# Patient Record
Sex: MALE | Race: BLACK OR AFRICAN AMERICAN | NOT HISPANIC OR LATINO | Employment: FULL TIME | URBAN - METROPOLITAN AREA
[De-identification: names, ages, dates, MRNs, and addresses within clinical notes are randomized per-mention and may not be internally consistent; named-entity substitution may affect disease eponyms.]

---

## 2017-02-13 ENCOUNTER — TELEPHONE (OUTPATIENT)
Dept: RHEUMATOLOGY | Facility: CLINIC | Age: 57
End: 2017-02-13

## 2017-02-13 NOTE — TELEPHONE ENCOUNTER
----- Message from Alan Zavala sent at 2/13/2017  3:44 PM CST -----  Contact: self@mobile,  Pt called because he has Neuropathy and foot drop in both feet and needs to be seen. I offered 3/28/2017, but Pt stated that he needs to be seen as soon as possible. Please call Pt and advise if there are any openings this week.    Call back is mobile, [phone]    Thank you

## 2017-02-14 ENCOUNTER — TELEPHONE (OUTPATIENT)
Dept: RHEUMATOLOGY | Facility: CLINIC | Age: 57
End: 2017-02-14

## 2017-02-14 ENCOUNTER — INITIAL CONSULT (OUTPATIENT)
Dept: RHEUMATOLOGY | Facility: CLINIC | Age: 57
End: 2017-02-14
Payer: COMMERCIAL

## 2017-02-14 VITALS
SYSTOLIC BLOOD PRESSURE: 107 MMHG | WEIGHT: 148.19 LBS | BODY MASS INDEX: 21.22 KG/M2 | DIASTOLIC BLOOD PRESSURE: 71 MMHG | HEIGHT: 70 IN | HEART RATE: 98 BPM

## 2017-02-14 DIAGNOSIS — M21.372 BILATERAL FOOT-DROP: Primary | ICD-10-CM

## 2017-02-14 DIAGNOSIS — M21.371 BILATERAL FOOT-DROP: Primary | ICD-10-CM

## 2017-02-14 DIAGNOSIS — G63 POLYNEUROPATHY ASSOCIATED WITH UNDERLYING DISEASE: ICD-10-CM

## 2017-02-14 PROCEDURE — 99999 PR PBB SHADOW E&M-EST. PATIENT-LVL III: CPT | Mod: PBBFAC,,, | Performed by: INTERNAL MEDICINE

## 2017-02-14 PROCEDURE — 99245 OFF/OP CONSLTJ NEW/EST HI 55: CPT | Mod: S$GLB,,, | Performed by: INTERNAL MEDICINE

## 2017-02-14 RX ORDER — HUMAN INSULIN 100 [USP'U]/ML
INJECTION, SUSPENSION SUBCUTANEOUS
COMMUNITY
Start: 2017-01-09

## 2017-02-14 RX ORDER — PEN NEEDLE, DIABETIC 29 G X1/2"
NEEDLE, DISPOSABLE MISCELLANEOUS
COMMUNITY
Start: 2017-01-08

## 2017-02-14 ASSESSMENT — ROUTINE ASSESSMENT OF PATIENT INDEX DATA (RAPID3)
PAIN SCORE: 10
TOTAL RAPID3 SCORE: 7.61
PATIENT GLOBAL ASSESSMENT SCORE: 9.5
PSYCHOLOGICAL DISTRESS SCORE: 0
MDHAQ FUNCTION SCORE: 1
AM STIFFNESS SCORE: 0, NO
FATIGUE SCORE: 9

## 2017-02-14 NOTE — LETTER
February 14, 2017      Didier Brandt MD  [address] [place] [address]  [address]  [place] [address]           [facility]  [address]  [place] [address]  Phone: [phone]  Fax: [phone]          Patient: Abebe Asencio   MR Number: [id number]   YOB: 1960   Date of Visit: 2/14/2017       Dear Dr. Didier Brandt:    Thank you for referring Abebe Asencio to me for evaluation. Attached you will find relevant portions of my assessment and plan of care.    If you have questions, please do not hesitate to call me. I look forward to following Abebe Asencio along with you.    Sincerely,    Rita Mcneal MD    Enclosure  CC:  No Recipients    If you would like to receive this communication electronically, please contact [email or web address] or [phone] to request more information on [employer] access.    For providers and/or their staff who would like to refer a patient to [facility], please contact us through our one-stop-shop provider referral line, [facility], at [phone].    If you feel you have received this communication in error or would no longer like to receive these types of communications, please e-mail [email or web address]

## 2017-02-14 NOTE — PROGRESS NOTES
Subjective:       Patient ID: Abebe Asencio is a 56 y.o. male sent for consultation on possible vasculitis as a cause of bilateral foot drop by Dr.Steven Brandt     Chief Complaint: Disease Management    HPI   56 yr old man who has been diabetic x >10 yrs.  He was doing very well until 5-6 months ago when he began to experience numbness & tingling in his feet & then noted foot drop first on left and then right foot as well. He was also experiencing tingling in hands. Had EMG by Dr.Steven Brandt on left leg at [facility] [phone]) and told to see a rheumatologist.  He was put on gabapentin 100 mg tid, which is helping his paresthesias a little. He's been on it a few months. 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     Occasional swelling of calves & feet.   Weighed 185 lb 2 years ago. Weighed 170 lb 1.5 years. Gradually has lost weight.    AM stiffness a few minutes. Denies joint pain, joint swelling, Raynaud's, dysphagia, tight skin, alopecia, oral ulcers, sicca symptoms, pleurisy, pericarditis, photosensitivity, skin rashes, thromboses.      Current Outpatient Prescriptions   Medication Sig Dispense Refill    insulin syringe-needle U-100 0.3 mL 30 gauge x 5/16 Syrg       metformin (GLUCOPHAGE) 500 MG tablet Take 500 mg by mouth 2 (two) times daily with meals.      NOVOLIN 70/30 100 unit/mL (70-30) injection        No current facility-administered medications for this visit.        Review of patient's allergies indicates:  No Known Allergies    Past Medical History   Diagnosis Date    Diabetes mellitus    Had abscess drained from back of head 7/15  Past Surgical History   Procedure Laterality Date    Appendectomy      Knee surgery       l knee       Review of Systems   Constitutional: Positive for fatigue and unexpected weight change (40 lbs over 2 years; gradual weight loss.). Negative for fever.   HENT: Negative.  Negative for hearing loss, mouth  "sores, sore throat, tinnitus and trouble swallowing.    Eyes: Negative.  Negative for visual disturbance.        Had bilateral cataract extractions June & July of 2016   Respiratory: Negative.  Negative for cough, choking, chest tightness and shortness of breath.    Cardiovascular: Positive for leg swelling. Negative for chest pain and palpitations.   Gastrointestinal: Positive for diarrhea. Negative for abdominal pain, blood in stool, constipation, nausea and vomiting.   Genitourinary: Negative.  Negative for frequency, hematuria and urgency.        Erectile dysfunction.   Musculoskeletal: Negative.  Negative for arthralgias, back pain, joint swelling, myalgias, neck pain and neck stiffness.   Skin: Negative.  Negative for rash.   Neurological: Positive for light-headedness (infrequent) and numbness. Negative for dizziness, syncope and headaches.   Hematological: Does not bruise/bleed easily.   Psychiatric/Behavioral: Negative.  Negative for dysphoric mood and sleep disturbance. The patient is not nervous/anxious.        Family History   Problem Relation Age of Onset    Diabetes Mother     Heart disease Mother     Diabetes Father     Heart disease Father    both parents with DM: ages 64 & 71  Brother 58 is diabetic.  2 children in good health; one with asthma.  Social History   Substance Use Topics    Smoking status: Never Smoker    Smokeless tobacco: None    Alcohol use Yes     Walked every day until foot drops intervened. .       Objective:     Visit Vitals    /71    Pulse 98    Ht 5' 10" (1.778 m)    Wt 67.2 kg (148 lb 3.2 oz)    BMI 21.26 kg/m2        Physical Exam   Vitals reviewed.  Constitutional: He is oriented to person, place, and time and well-developed, well-nourished, and in no distress. No distress.   HENT:   Head: Normocephalic and atraumatic.   Mouth/Throat: Oropharynx is clear and moist. No oropharyngeal exudate.   No facial rashes  Parotids not enlarged  No oral ulcers   Eyes: " Conjunctivae and EOM are normal. Pupils are equal, round, and reactive to light. Right eye exhibits no discharge. Left eye exhibits no discharge. No scleral icterus.   Neck: Neck supple. No JVD present. No tracheal deviation present. No thyromegaly present.   Cardiovascular: Normal rate, regular rhythm, normal heart sounds and intact distal pulses.  Exam reveals no gallop and no friction rub.    No murmur heard.  Pulmonary/Chest: Effort normal and breath sounds normal. No respiratory distress. He has no wheezes. He has no rales. He exhibits no tenderness.   Abdominal: Soft. Bowel sounds are normal. He exhibits no distension and no mass. There is no splenomegaly or hepatomegaly. There is no tenderness. There is no rebound and no guarding.   Lymphadenopathy:     He has no cervical adenopathy.        Right: No inguinal adenopathy present.        Left: No inguinal adenopathy present.   Neurological: He is alert and oriented to person, place, and time. He has normal reflexes. No cranial nerve deficit. Gait normal.   Proximal strength 5/5.  Distal UE: 5/5;  Distal LE: cannot dorsiflex; decreased eversion;   Plantar flexion ok;    Skin: Skin is warm and dry. No rash noted. He is not diaphoretic.     Psychiatric: Mood, memory, affect and judgment normal.   Musculoskeletal: Normal range of motion. He exhibits no edema or tenderness.   Joint exam:  FROM all joints  No synovitis anywhere.        1/24/17:  (196);  (67)      1/25/17: EMG ([facility]): severe chronic & acute sensorimotor polyneuropathy with axonal and demyelinating features.  The acute denervation was seen in the lower extremity only. L cervical and lumbosacral polyneuropathy cannot be excluded. The patient has diabetes which may be the source of these findings but CIDP and possibly overlying motor neuron disease cannot be excluded.   Assessment:       Bilateral food drop    EMG: severe acute & chronic sensorimotor polyneuropathy   R/O  CIDP   Doubt systemic vaculitis  DM with polyneuropathy   Sxs: hands & feet   S/P abscess back of head 7/15  Mildly elevated CPK   No significance  Elevated ACE   Probably secondary to DM      Plan:   Labs & UA.  Refer back to Dr. Brandt to handle neurological disorder  May need CSF, MRI, bx?        C.C.: MD Brian Burrows MD (# [phone])      Addendum: Hg 12.9; Ht 36.8; UA SGr. 1.030, 3+ glu

## 2017-02-14 NOTE — MR AVS SNAPSHOT
"    [facility] - Rheumatology  [address]  [facility] [address]  Phone: [phone]  Fax: [phone]                  Abebe Asencio   2017 2:00 PM   Initial consult    Description:  Male : 1960   Provider:  Rita Mcneal MD   Department:  [facility] - Rheumatology           Reason for Visit     Disease Management           Diagnoses this Visit        Comments    Bilateral foot-drop    -  Primary     Polyneuropathy associated with underlying disease                To Do List           Goals (5 Years of Data)     None      [facility]     [facility] Care Line -  Assistance  Registered nurses in the [facility] provide clinical advisement, health education, appointment booking, and other advisory services.  Call for this free service at [phone].             Medications           Message regarding Medications     Verify the changes and/or additions to your medication regime listed below are the same as discussed with your clinician today.  If any of these changes or additions are incorrect, please notify your healthcare provider.             Verify that the below list of medications is an accurate representation of the medications you are currently taking.  If none reported, the list may be blank. If incorrect, please contact your healthcare provider. Carry this list with you in case of emergency.           Current Medications     insulin syringe-needle U-100 0.3 mL 30 gauge x 5/16 Syrg     metformin (GLUCOPHAGE) 500 MG tablet Take 500 mg by mouth 2 (two) times daily with meals.    NOVOLIN 70/30 100 unit/mL (70-30) injection            Clinical Reference Information           Your Vitals Were     BP Pulse Height Weight BMI    107/71 98 5' 10" (1.778 m) 67.2 kg (148 lb 3.2 oz) 21.26 kg/m2      Blood Pressure          Most Recent Value    BP  107/71      Allergies as of 2017     No Known Allergies      Immunizations Administered on Date of " Encounter - 2/14/2017     None      Orders Placed During Today's Visit      Normal Orders This Visit    Cryoglobulin     Future Labs/Procedures Expected by Expires    Anti-neutrophilic cytoplasmic antibody  2/14/2017 2/14/2018    Hepatitis B core antibody, total  2/14/2017 4/15/2018    Hepatitis B surface antibody  2/14/2017 4/15/2018    Hepatitis B surface antigen  2/14/2017 4/15/2018    Hepatitis C antibody  2/14/2017 4/15/2018    Myeloperoxidase Antibody (MPO)  2/14/2017 4/15/2018    Protein / creatinine ratio, urine  2/14/2017 4/15/2018    Proteinase 3 Autoantibodies  2/14/2017 4/15/2018    Urinalysis  2/14/2017 4/15/2018    Recurring Lab Work Interval Expires    C-reactive protein   2/14/2018    CBC auto differential   2/14/2018    Comprehensive metabolic panel   2/14/2018    Sedimentation rate, manual   2/14/2018      [facility] Sign-Up     Activating your [facility] account is as easy as 1-2-3!     1) Visit [email or web address], select Sign Up Now, enter this activation code and your date of birth, then select Next.  [email or web address]  Expires: 3/31/2017  3:22 PM      2) Create a username and password to use when you visit [facility] in the future and select a security question in case you lose your password and select Next.    3) Enter your e-mail address and click Sign Up!    Additional Information  If you have questions, please e-mail [email or web address] or call [phone] to talk to our [facility] staff. Remember, [facility] is NOT to be used for urgent needs. For medical emergencies, dial 911.         Language Assistance Services     ATTENTION: Language assistance services are available, free of charge. Please call [phone].      ATENCIÓN: Si habla neli, tiene a lr disposición servicios gratuitos de asistencia lingüística. Llame al [phone].     CHÚ Ý: N?u b?n nói Ti?ng Vi?t, có các d?ch v? h? tr? ngôn ng? mi?n phí dành cho b?n. G?i s? [phone].         [facility] complies with  applicable Federal civil rights laws and does not discriminate on the basis of race, color, national origin, age, disability, or sex.